# Patient Record
Sex: MALE | Race: WHITE | NOT HISPANIC OR LATINO | Employment: FULL TIME | URBAN - METROPOLITAN AREA
[De-identification: names, ages, dates, MRNs, and addresses within clinical notes are randomized per-mention and may not be internally consistent; named-entity substitution may affect disease eponyms.]

---

## 2019-05-06 ENCOUNTER — TELEPHONE (OUTPATIENT)
Dept: UROLOGY | Facility: CLINIC | Age: 34
End: 2019-05-06

## 2019-05-06 NOTE — TELEPHONE ENCOUNTER
Called patient and scheduled appointment for 5/13 after he's done taking antibiotics. Patient requested release of information form be faxed over to him since he doesn't live in the area and will be out of town soon. Form was faxed to him at [phone].

## 2019-05-06 NOTE — TELEPHONE ENCOUNTER
I have personally called and spoken to pt this morning regarding his LUTS.  Pt went to [facility] 2-3 weeks ago and was treated for possible UTI/Prostatitis symptoms.  Pt is currently on Cipro BID and will complete 21-day regimen of antibx's tomorrow.    Therefore please call him to reschedule with me later in week or next week.  We also need to obtain records from [facility].  Thanks.

## 2019-05-10 ENCOUNTER — TELEPHONE (OUTPATIENT)
Dept: UROLOGY | Facility: CLINIC | Age: 34
End: 2019-05-10

## 2019-05-10 NOTE — TELEPHONE ENCOUNTER
Called patient and informed him that we received records for his appointment.    ----- Message from Sherly Dougherty sent at 5/10/2019  8:35 AM CDT -----  Contact: patient  Type: Needs Medical Advice    Who Called:  patient  Best Call Back Number: [phone]  Additional Information: would like for the nurse to give him a call back in regards to any additional paperwork that may be needed prior to his appointment on Monday.

## 2019-05-10 NOTE — PROGRESS NOTES
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  He is  with one partner.    The pt initially went to [facility] in [place] on 4/17/19 with c/o dysuria x 4 days prior with urinary frequency and urgency.  He denies urethral discharge.  Pt was diagnosed with prostatitis and prescribed Cipro 500 mg BID x 21 days.    As of TODAY, pt c/o bladder discomfort, did dissipate a little since finishing Cipro antibx's, but restarted with symptoms after he returned from vacation.    Urine culture was performed during UC visit and showed No growth for infection.  Urinalysis showed the following during UC visit:  Dark Orange urine  1.005  6.5  Glucose--100  Bili-1.010  10 Protein; Urobili-4.0  +Nitrites    Questions asked the pt during ov today:  Urgency: Sometimes, urge incontinence? None  Feels like he completely empties his bladder.  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  Was drinking a lot of green tea.    Last PSA Screening: No results found for: PSA, PSADIAG    REVIEW OF SYSTEMS:  A comprehensive 10 system review was performed and is negative except as noted above in HPI    PMHx:  Past Medical History:   Diagnosis Date    HPV in male      Kidney stones: No  Cataracts? None    PSHx:  History reviewed. No pertinent surgical history.    Fam Hx:   malignancies: No    kidney stones: No     Soc Hx:  No " tobacco use  No alcohol  Occupation:Ultrasound Representative    Allergies:  Patient has no known allergies.    Medications: reviewed   Objective:     Vitals:    05/13/19 0909   BP: 137/89   Pulse: 72   Resp: 18   Temp: 98.3 °F (36.8 °C)     General:WDWN in NAD  Eyes: PERRLA, normal conjunctiva  Respiratory: no increased work on breathing, clear to auscultation  Cardiovascular: regular rate and rhythm. No obvious extremity edema.  GI: palpation of masses. No tenderness. No hepatosplenomegaly to palpation.  Musculoskeletal: normal range of motion of bilateral upper extremities. Normal muscle strength and tone.  Skin: no obvious rashes or lesions. No tightening of skin noted.  Neurologic: CN grossly normal. Normal sensation.   Psychiatric: awake, alert and oriented x 3. Mood and affect normal. Cooperative.    :  Inspection of anus and perineum normal  No scrotal rashes, cysts or lesions  Epididymis normal in size, no tenderness  Testes normal and size, equal size bilaterally, no masses  Urethral meatus normal without discharge  Penis is circumcised,    DEDRICK: 25g prostate gland without masses, tenderness. SV not palpable. Normal sphincter tone. No hemhorroids.  No bilateral inguinal hernias noted     PVR by bladder scan performed by me today: 33 mL    LABS REVIEW:  UA today:  Color:Clear, Yellow  Spec. Grav.  1.015  PH  7.0  Negative for leukocytes, nitrates, protein, glucose, ketones, urobili, bili, and blood.    Assessment:       1. Bladder pain    2. Dysuria          Plan:   Dysuria, Bladder discomfort:  (Prostatitis symptoms vs. IC)    We will prescribe pt another round of antibx's for pt symptoms:  Vibra-tabs 100 mg BID x 30 days.  Bladder irritant info given to pt during ov today, with all questions answered at this time.    F/u with me in one month to recheck symptoms.    [facility]: Inactive    Adela Sharma, TIMBO

## 2019-05-13 ENCOUNTER — OFFICE VISIT (OUTPATIENT)
Dept: UROLOGY | Facility: CLINIC | Age: 34
End: 2019-05-13
Payer: COMMERCIAL

## 2019-05-13 VITALS
DIASTOLIC BLOOD PRESSURE: 89 MMHG | HEART RATE: 72 BPM | SYSTOLIC BLOOD PRESSURE: 137 MMHG | HEIGHT: 73 IN | WEIGHT: 195.75 LBS | RESPIRATION RATE: 18 BRPM | TEMPERATURE: 98 F | BODY MASS INDEX: 25.94 KG/M2

## 2019-05-13 DIAGNOSIS — R39.89 BLADDER PAIN: Primary | ICD-10-CM

## 2019-05-13 DIAGNOSIS — R30.0 DYSURIA: ICD-10-CM

## 2019-05-13 LAB
BILIRUB SERPL-MCNC: NORMAL MG/DL
BLOOD URINE, POC: NORMAL
COLOR, POC UA: NORMAL
GLUCOSE UR QL STRIP: NORMAL
KETONES UR QL STRIP: NORMAL
LEUKOCYTE ESTERASE URINE, POC: NORMAL
NITRITE, POC UA: NORMAL
PH, POC UA: 7
POC RESIDUAL URINE VOLUME: 33 ML (ref 0–100)
PROTEIN, POC: NORMAL
SPECIFIC GRAVITY, POC UA: 1.01
UROBILINOGEN, POC UA: 0.2

## 2019-05-13 PROCEDURE — 99999 PR PBB SHADOW E&M-EST. PATIENT-LVL III: ICD-10-PCS | Mod: PBBFAC,,, | Performed by: NURSE PRACTITIONER

## 2019-05-13 PROCEDURE — 3008F BODY MASS INDEX DOCD: CPT | Mod: CPTII,S$GLB,, | Performed by: NURSE PRACTITIONER

## 2019-05-13 PROCEDURE — 81002 POCT URINE DIPSTICK WITHOUT MICROSCOPE: ICD-10-PCS | Mod: S$GLB,,, | Performed by: NURSE PRACTITIONER

## 2019-05-13 PROCEDURE — 99204 OFFICE O/P NEW MOD 45 MIN: CPT | Mod: 25,S$GLB,, | Performed by: NURSE PRACTITIONER

## 2019-05-13 PROCEDURE — 99204 PR OFFICE/OUTPT VISIT, NEW, LEVL IV, 45-59 MIN: ICD-10-PCS | Mod: 25,S$GLB,, | Performed by: NURSE PRACTITIONER

## 2019-05-13 PROCEDURE — 99999 PR PBB SHADOW E&M-EST. PATIENT-LVL III: CPT | Mod: PBBFAC,,, | Performed by: NURSE PRACTITIONER

## 2019-05-13 PROCEDURE — 51798 POCT BLADDER SCAN: ICD-10-PCS | Mod: S$GLB,,, | Performed by: NURSE PRACTITIONER

## 2019-05-13 PROCEDURE — 3008F PR BODY MASS INDEX (BMI) DOCUMENTED: ICD-10-PCS | Mod: CPTII,S$GLB,, | Performed by: NURSE PRACTITIONER

## 2019-05-13 PROCEDURE — 51798 US URINE CAPACITY MEASURE: CPT | Mod: S$GLB,,, | Performed by: NURSE PRACTITIONER

## 2019-05-13 PROCEDURE — 81002 URINALYSIS NONAUTO W/O SCOPE: CPT | Mod: S$GLB,,, | Performed by: NURSE PRACTITIONER

## 2019-05-13 RX ORDER — DOXYCYCLINE HYCLATE 100 MG
100 TABLET ORAL 2 TIMES DAILY
Qty: 60 TABLET | Refills: 0 | Status: SHIPPED | OUTPATIENT
Start: 2019-05-13 | End: 2019-06-13

## 2019-06-06 ENCOUNTER — TELEPHONE (OUTPATIENT)
Dept: UROLOGY | Facility: CLINIC | Age: 34
End: 2019-06-06

## 2019-06-06 RX ORDER — FLUCONAZOLE 150 MG/1
150 TABLET ORAL DAILY
Qty: 3 TABLET | Refills: 0 | Status: SHIPPED | OUTPATIENT
Start: 2019-06-06 | End: 2019-06-09

## 2019-06-06 NOTE — TELEPHONE ENCOUNTER
Returned call to patient to discuss symptoms. He states that he stopped taking gummy probiotics while changing his diet for IC and soon after, his anus started to get itchy. Patient isn't sure if it's an allergic reaction or a side effect of yeast growth. He's currently using his wife's monistat cream externally and it helps minimally, but he wants to know what else he can do to treat current symptoms.     ----- Message from Gerber Herring sent at 6/6/2019  8:19 AM CDT -----  Type: Needs Medical Advice    Who Called: self   Symptoms (please be specific):  NA   How long has patient had these symptoms:  RABIA   Pharmacy name and phone #:  RABIA Best Call Back Number: [phone]  Additional Information: Patient think the prescribed antibiotic maybe causing an allergic reaction.

## 2019-06-13 ENCOUNTER — OFFICE VISIT (OUTPATIENT)
Dept: UROLOGY | Facility: CLINIC | Age: 34
End: 2019-06-13
Payer: COMMERCIAL

## 2019-06-13 VITALS
WEIGHT: 193.13 LBS | BODY MASS INDEX: 25.6 KG/M2 | TEMPERATURE: 98 F | HEIGHT: 73 IN | RESPIRATION RATE: 18 BRPM | SYSTOLIC BLOOD PRESSURE: 134 MMHG | DIASTOLIC BLOOD PRESSURE: 91 MMHG | HEART RATE: 72 BPM

## 2019-06-13 DIAGNOSIS — N36.8 PAIN IN URETHRAL MEATUS: ICD-10-CM

## 2019-06-13 DIAGNOSIS — R39.15 URINARY URGENCY: ICD-10-CM

## 2019-06-13 DIAGNOSIS — R30.0 DYSURIA: ICD-10-CM

## 2019-06-13 DIAGNOSIS — R35.0 INCREASED URINARY FREQUENCY: ICD-10-CM

## 2019-06-13 DIAGNOSIS — R39.89 BLADDER PAIN: Primary | ICD-10-CM

## 2019-06-13 LAB
BILIRUB SERPL-MCNC: ABNORMAL MG/DL
BLOOD URINE, POC: ABNORMAL
COLOR, POC UA: YELLOW
GLUCOSE UR QL STRIP: ABNORMAL
KETONES UR QL STRIP: ABNORMAL
LEUKOCYTE ESTERASE URINE, POC: ABNORMAL
NITRITE, POC UA: ABNORMAL
PH, POC UA: 6.5
PROTEIN, POC: ABNORMAL
SPECIFIC GRAVITY, POC UA: 1.01
UROBILINOGEN, POC UA: 0.2

## 2019-06-13 PROCEDURE — 99999 PR PBB SHADOW E&M-EST. PATIENT-LVL IV: CPT | Mod: PBBFAC,,, | Performed by: NURSE PRACTITIONER

## 2019-06-13 PROCEDURE — 81002 URINALYSIS NONAUTO W/O SCOPE: CPT | Mod: S$GLB,,, | Performed by: NURSE PRACTITIONER

## 2019-06-13 PROCEDURE — 99214 PR OFFICE/OUTPT VISIT, EST, LEVL IV, 30-39 MIN: ICD-10-PCS | Mod: 25,S$GLB,, | Performed by: NURSE PRACTITIONER

## 2019-06-13 PROCEDURE — 3008F BODY MASS INDEX DOCD: CPT | Mod: CPTII,S$GLB,, | Performed by: NURSE PRACTITIONER

## 2019-06-13 PROCEDURE — 99999 PR PBB SHADOW E&M-EST. PATIENT-LVL IV: ICD-10-PCS | Mod: PBBFAC,,, | Performed by: NURSE PRACTITIONER

## 2019-06-13 PROCEDURE — 3008F PR BODY MASS INDEX (BMI) DOCUMENTED: ICD-10-PCS | Mod: CPTII,S$GLB,, | Performed by: NURSE PRACTITIONER

## 2019-06-13 PROCEDURE — 99214 OFFICE O/P EST MOD 30 MIN: CPT | Mod: 25,S$GLB,, | Performed by: NURSE PRACTITIONER

## 2019-06-13 PROCEDURE — 81002 POCT URINE DIPSTICK WITHOUT MICROSCOPE: ICD-10-PCS | Mod: S$GLB,,, | Performed by: NURSE PRACTITIONER

## 2019-06-13 RX ORDER — ALFUZOSIN HYDROCHLORIDE 10 MG/1
10 TABLET, EXTENDED RELEASE ORAL
Qty: 30 TABLET | Refills: 12 | Status: SHIPPED | OUTPATIENT
Start: 2019-06-13 | End: 2019-07-13

## 2019-06-13 RX ORDER — FLUCONAZOLE 150 MG/1
150 TABLET ORAL DAILY
Qty: 3 TABLET | Refills: 0 | Status: SHIPPED | OUTPATIENT
Start: 2019-06-13 | End: 2019-06-16

## 2019-06-13 NOTE — PATIENT INSTRUCTIONS
Alfuzosin extended-release tablets  What is this medicine?  ALFUZOSIN (al FYOO jonah sin) is used to treat benign prostatic hyperplasia (BPH) in men. This is a condition that causes you to have an enlarged prostate. This medicine is not for use in women.  How should I use this medicine?  Take this medicine by mouth with a glass of water. Follow the directions on the prescription label. Take this medicine after the same meal every day. This medicine should be taken just after eating food. Do not take on an empty stomach. Swallow whole. Do not cut, crush or chew this medicine. Take your doses at regular intervals. Do not take your medicine more often than directed. Do not stop taking except on the advice of your doctor or health care professional.  Talk to your pediatrician regarding the use of this medicine in children. Special care may be needed.  What side effects may I notice from receiving this medicine?  Side effects that you should report to your doctor or health care professional as soon as possible:  · allergic reactions like skin rash, itching or hives, swelling of the face, lips, or tongue  · breathing problems  · fast, irregular heartbeat  · feeling faint or lightheaded, falls  · prolonged or painful erection  · swelling of ankles or legs  · unusually weak or tired  · yellowing of eyes or skin  Side effects that usually do not require medical attention (report to your doctor or health care professional if they continue or are bothersome):  · constipation or diarrhea  · difficulty sleeping  · headache  · nausea or upset stomach  What may interact with this medicine?  Do not take this medicine with any of the following medications:  · certain medicines for fungal infections like fluconazole, itraconazole, ketoconazole, posaconazole, voriconazole  · cisapride  · dofetilide  · dronedarone  · droperidol  · levomethadyl  · other medicines for prostate  problems  · pimozide  · ritonavir  · thioridazine  · ziprasidone  This medicine may also interact with the following medications:  · cimetidine  · certain medicines for chest pain or blood pressure  · other medicines that prolong the QT interval (cause an abnormal heart rhythm)  · sildenafil  · tadalafil  · vardenafil  What if I miss a dose?  If you miss a dose, take it as soon as you can. If it is almost time for your next dose, take only that dose. Do not take double or extra doses.  Where should I keep my medicine?  Keep out of the reach of children.  Store at room temperature between 15 and 30 degrees C (59 and 86 degrees F). Protect from light and moisture. Throw away any unused medicine after the expiration date.  What should I tell my health care provider before I take this medicine?  They need to know if you have any of the following conditions:  · kidney or liver disease  · low blood pressure  · an unusual or allergic reaction to alfuzosin, other medicines, foods, dyes, or preservatives  What should I watch for while using this medicine?  Visit your doctor or health care professional for regular checks on your progress. Check your blood pressure regularly. Ask your doctor or health care professional what your blood pressure should be and when you should contact him or her.  Drowsiness and dizziness are more likely to occur after the first dose, after an increase in dose, or during hot weather or exercise. These effects can decrease once your body adjusts to this medicine. Do not drive, use machinery, or do anything that needs mental alertness until you know how this drug affects you. Do not stand or sit up quickly, especially if you are an older patient. This reduces the risk of dizzy or fainting spells. Alcohol can make you more drowsy and dizzy. Avoid alcoholic drinks.  Contact your doctor or health care professional right away if you have an erection that lasts longer than 4 hours or if it becomes  painful. This may be a sign of a serious problem and must be treated right away to prevent permanent damage.  NOTE:This sheet is a summary. It may not cover all possible information. If you have questions about this medicine, talk to your doctor, pharmacist, or health care provider. Copyright© 2017 [employer]

## 2019-06-13 NOTE — PROGRESS NOTES
"[facility] Note  Staff: TIMBO Rincon    PCP: None    Chief Complaint: Follow-up:  Hx of dysuria, bladder pain    Subjective:        HPI: Armand Maldonado is a 33 y.o. male presents today for routine recheck of his lower urinary tract symptoms.  After last ov with me, pt was given another round of antibiotics for his LUTS-Vibra-tabs 100 mg BID since receiving Cipro antibx's from recent UC visit for same symptoms prior.    On 6/6/19 pt called our telephone line and c/o the following:  "Pt stated that he stopped taking gummy probiotics while changing his diet for IC and soon after, his anus started to get itchy. Patient isn't sure if it's an allergic reaction or a side effect of yeast growth. He's currently using his wife's monistat cream externally and it helps minimally, but he wants to know what else he can do to treat current symptoms"  Therefore we responded back stating unsure whether anal itching could be SE of Vibra-tabs but went ahead and prescribed him Diflucan to see if this relieved his symptoms which pt states today it has improved his anal itching at this time.    Since taking the Vibra-tabs regimen (2nd antibx treatment regimen), pt's bladder pain has improved, but still remains with the following LUTS as of TODAY:  Urinary frequency and urgency and hesitancy  Underwear causing discomfort "tingling sensation at tip of penis"  Urine stream starts out "slow" intermittently not all the time.  It "sprays" then becomes full.  Urethral and dysuria pain when the bladder fills  Pain within the urethra even when he has an empty bladder.    Mild to mod constipation which he has started Benefiber for this issue.    PT'S  HX:  Pt was seen by me on 5/13/19 as a NP with c/o recent onset Dysuria and bladder pain which begain on 4/13/19 and here today for further evaluation of symptoms.  He is  with one partner.     The pt initially went to [facility] in [place] on 4/17/19 " "with c/o dysuria x 4 days prior with urinary frequency and urgency.  He denies urethral discharge.  Pt was diagnosed with prostatitis and prescribed Cipro 500 mg BID x 21 days.     As of last ov, pt was c/o bladder discomfort, did dissipate a little since finishing Cipro antibx's, but restarted with symptoms after he returned from vacation.     Urine culture was performed during UC visit and showed No growth for infection.  Urinalysis showed the following during UC visit:  Dark Orange urine  1.005  6.5  Glucose--100  Bili-1.010  10 Protein; Urobili-4.0  +Nitrites     Questions asked the pt during (05/2019) 1st office visit w/ Urology:  Urgency: Sometimes, urge incontinence? None  Feels like he completely empties his bladder.  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  Was drinking a lot of green tea.     Last PSA Screening: No results found for: PSA, PSADIAG     REVIEW OF SYSTEMS:  A comprehensive 10 system review was performed and is negative except as noted above in HPI     PMHx:  Past Medical History:   Diagnosis Date    HPV in male      Kidney stones: No    PSHx:  History reviewed. No pertinent surgical history.    Fam Hx:   malignancies: No  kidney stones: No     Allergies:  Patient has no known allergies.  Medications: reviewed   Objective:     Vitals:    06/13/19 1425   BP: (!) 134/91   Pulse: 72   Resp: 18   Temp: 97.8 °F (36.6 °C)     General:WDWN in NAD  Eyes: PERRLA, normal conjunctiva  Respiratory: no increased work on breathing, clear to auscultation  Cardiovascular: regular rate and rhythm. No obvious extremity edema.  GI: palpation of masses. No tenderness. No hepatosplenomegaly to " palpation.  Musculoskeletal: normal range of motion of bilateral upper extremities. Normal muscle strength and tone.  Skin: no obvious rashes or lesions. No tightening of skin noted.  Neurologic: CN grossly normal. Normal sensation.   Psychiatric: awake, alert and oriented x 3. Mood and affect normal. Cooperative.     Exam on 5/13/19:  Inspection of anus and perineum normal  No scrotal rashes, cysts or lesions  Epididymis normal in size, no tenderness  Testes normal and size, equal size bilaterally, no masses  Urethral meatus normal without discharge  Penis is circumcised,    DEDRICK: 25g prostate gland without masses, tenderness. SV not palpable. Normal sphincter tone. No hemhorroids.  No bilateral inguinal hernias noted   PVR by bladder scan performed by me on 5/13/19: 33 mL    LABS REVIEW:  UA today:  Color:Clear, Yellow  Spec. Grav.  1.010  PH  6.5  Trace of blood  Assessment:       1. Bladder pain    2. Dysuria    3. Pain in urethral meatus    4. Increased urinary frequency    5. Urinary urgency          Plan:   Dysuria, Bladder pain Follow-up:    Today, I have thoroughly discussed (over 30 min discussion with education) with pt several options for further treatment and evaluation of his ongoing symptoms >2 months including but not limited to the following:  Chronic prostatitis vs. Possible urethral stricture vs. IC vs. Urethritis vs. Kidney stone.    My recommendation would be the following:  -Pt to F/up with Urologist for possible UFS and Cysto if indicated by MD  -In meantime I will prescribe pt a trial of Uroxatral to see if this improves or worsens his symptoms at this time.  Benefits and risks of medication has been explained to pt today with all questions answered.    F/u with Urologist to discuss further options for evaluation of LUTS, unrelieved.    [facility]: None    Adela Sharma, BHAVIK-BRITNEY

## 2019-06-21 ENCOUNTER — TELEPHONE (OUTPATIENT)
Dept: UROLOGY | Facility: CLINIC | Age: 34
End: 2019-06-21

## 2019-06-21 NOTE — TELEPHONE ENCOUNTER
Spoke w pt declined earlier appt due to he has scheduled with another urologist in [place]. Stated that it was close to his home . Will cancel future appt as well.

## 2020-09-28 NOTE — TELEPHONE ENCOUNTER
Addended by: CANDICE CANTU on: 9/28/2020 09:47 AM     Modules accepted: Shelbi, Lucy     Patient informed of medication called to treat symptoms.              Adela Sharma, HECTORP  Nicky Estes MA   Caller: Unspecified (Today,  8:31 AM)             Call the pt and advise him that I will prescribe him 3 day regimen of Diflucan.   I have never heard of Doxy causing anal itching, but may be side affect?  We will try the Diflucan internally and then he can use monistat cream topically at same time.

## 2021-11-09 ENCOUNTER — OFFICE VISIT (OUTPATIENT)
Dept: OTOLARYNGOLOGY | Facility: CLINIC | Age: 36
End: 2021-11-09
Payer: COMMERCIAL

## 2021-11-09 VITALS — HEIGHT: 73 IN | WEIGHT: 213.63 LBS | BODY MASS INDEX: 28.31 KG/M2

## 2021-11-09 DIAGNOSIS — H61.22 LEFT EAR IMPACTED CERUMEN: Primary | ICD-10-CM

## 2021-11-09 DIAGNOSIS — H92.02 OTALGIA OF LEFT EAR: ICD-10-CM

## 2021-11-09 DIAGNOSIS — H91.90 HEARING LOSS, UNSPECIFIED HEARING LOSS TYPE, UNSPECIFIED LATERALITY: ICD-10-CM

## 2021-11-09 PROCEDURE — 69210 PR REMOVAL IMPACTED CERUMEN REQUIRING INSTRUMENTATION, UNILATERAL: ICD-10-PCS | Mod: S$GLB,,, | Performed by: OTOLARYNGOLOGY

## 2021-11-09 PROCEDURE — 99999 PR PBB SHADOW E&M-NEW PATIENT-LVL III: ICD-10-PCS | Mod: PBBFAC,,, | Performed by: OTOLARYNGOLOGY

## 2021-11-09 PROCEDURE — 1160F RVW MEDS BY RX/DR IN RCRD: CPT | Mod: CPTII,S$GLB,, | Performed by: OTOLARYNGOLOGY

## 2021-11-09 PROCEDURE — 99999 PR PBB SHADOW E&M-NEW PATIENT-LVL III: CPT | Mod: PBBFAC,,, | Performed by: OTOLARYNGOLOGY

## 2021-11-09 PROCEDURE — 69210 REMOVE IMPACTED EAR WAX UNI: CPT | Mod: S$GLB,,, | Performed by: OTOLARYNGOLOGY

## 2021-11-09 PROCEDURE — 1159F MED LIST DOCD IN RCRD: CPT | Mod: CPTII,S$GLB,, | Performed by: OTOLARYNGOLOGY

## 2021-11-09 PROCEDURE — 99203 OFFICE O/P NEW LOW 30 MIN: CPT | Mod: 25,S$GLB,, | Performed by: OTOLARYNGOLOGY

## 2021-11-09 PROCEDURE — 99203 PR OFFICE/OUTPT VISIT, NEW, LEVL III, 30-44 MIN: ICD-10-PCS | Mod: 25,S$GLB,, | Performed by: OTOLARYNGOLOGY

## 2021-11-09 PROCEDURE — 1159F PR MEDICATION LIST DOCUMENTED IN MEDICAL RECORD: ICD-10-PCS | Mod: CPTII,S$GLB,, | Performed by: OTOLARYNGOLOGY

## 2021-11-09 PROCEDURE — 1160F PR REVIEW ALL MEDS BY PRESCRIBER/CLIN PHARMACIST DOCUMENTED: ICD-10-PCS | Mod: CPTII,S$GLB,, | Performed by: OTOLARYNGOLOGY

## 2021-11-09 PROCEDURE — 3008F BODY MASS INDEX DOCD: CPT | Mod: CPTII,S$GLB,, | Performed by: OTOLARYNGOLOGY

## 2021-11-09 PROCEDURE — 3008F PR BODY MASS INDEX (BMI) DOCUMENTED: ICD-10-PCS | Mod: CPTII,S$GLB,, | Performed by: OTOLARYNGOLOGY

## 2021-11-09 RX ORDER — MIRABEGRON 50 MG/1
50 TABLET, FILM COATED, EXTENDED RELEASE ORAL DAILY
COMMUNITY
Start: 2019-09-01

## 2021-11-09 RX ORDER — ALFUZOSIN HYDROCHLORIDE 10 MG/1
10 TABLET, EXTENDED RELEASE ORAL DAILY
COMMUNITY
Start: 2019-06-01

## 2021-11-10 ENCOUNTER — IMMUNIZATION (OUTPATIENT)
Dept: FAMILY MEDICINE | Facility: CLINIC | Age: 36
End: 2021-11-10
Payer: COMMERCIAL

## 2021-11-10 DIAGNOSIS — Z23 NEED FOR VACCINATION: Primary | ICD-10-CM

## 2021-11-10 PROCEDURE — 91300 COVID-19, MRNA, LNP-S, PF, 30 MCG/0.3 ML DOSE VACCINE: CPT | Mod: PBBFAC | Performed by: INTERNAL MEDICINE

## 2021-11-10 PROCEDURE — 0002A COVID-19, MRNA, LNP-S, PF, 30 MCG/0.3 ML DOSE VACCINE: CPT | Mod: PBBFAC | Performed by: INTERNAL MEDICINE

## 2022-09-22 ENCOUNTER — OFFICE VISIT (OUTPATIENT)
Dept: DERMATOLOGY | Facility: CLINIC | Age: 37
End: 2022-09-22
Payer: COMMERCIAL

## 2022-09-22 DIAGNOSIS — L24.9 IRRITANT DERMATITIS: Primary | ICD-10-CM

## 2022-09-22 PROCEDURE — 1160F RVW MEDS BY RX/DR IN RCRD: CPT | Mod: CPTII,S$GLB,, | Performed by: STUDENT IN AN ORGANIZED HEALTH CARE EDUCATION/TRAINING PROGRAM

## 2022-09-22 PROCEDURE — 99203 OFFICE O/P NEW LOW 30 MIN: CPT | Mod: S$GLB,,, | Performed by: STUDENT IN AN ORGANIZED HEALTH CARE EDUCATION/TRAINING PROGRAM

## 2022-09-22 PROCEDURE — 99203 PR OFFICE/OUTPT VISIT, NEW, LEVL III, 30-44 MIN: ICD-10-PCS | Mod: S$GLB,,, | Performed by: STUDENT IN AN ORGANIZED HEALTH CARE EDUCATION/TRAINING PROGRAM

## 2022-09-22 PROCEDURE — 1160F PR REVIEW ALL MEDS BY PRESCRIBER/CLIN PHARMACIST DOCUMENTED: ICD-10-PCS | Mod: CPTII,S$GLB,, | Performed by: STUDENT IN AN ORGANIZED HEALTH CARE EDUCATION/TRAINING PROGRAM

## 2022-09-22 PROCEDURE — 99999 PR PBB SHADOW E&M-EST. PATIENT-LVL III: ICD-10-PCS | Mod: PBBFAC,,, | Performed by: STUDENT IN AN ORGANIZED HEALTH CARE EDUCATION/TRAINING PROGRAM

## 2022-09-22 PROCEDURE — 99999 PR PBB SHADOW E&M-EST. PATIENT-LVL III: CPT | Mod: PBBFAC,,, | Performed by: STUDENT IN AN ORGANIZED HEALTH CARE EDUCATION/TRAINING PROGRAM

## 2022-09-22 PROCEDURE — 1159F PR MEDICATION LIST DOCUMENTED IN MEDICAL RECORD: ICD-10-PCS | Mod: CPTII,S$GLB,, | Performed by: STUDENT IN AN ORGANIZED HEALTH CARE EDUCATION/TRAINING PROGRAM

## 2022-09-22 PROCEDURE — 1159F MED LIST DOCD IN RCRD: CPT | Mod: CPTII,S$GLB,, | Performed by: STUDENT IN AN ORGANIZED HEALTH CARE EDUCATION/TRAINING PROGRAM

## 2022-09-22 RX ORDER — BETAMETHASONE VALERATE 1.2 MG/G
OINTMENT TOPICAL 2 TIMES DAILY
Qty: 45 G | Refills: 1 | Status: SHIPPED | OUTPATIENT
Start: 2022-09-22

## 2022-09-22 RX ORDER — CLOTRIMAZOLE AND BETAMETHASONE DIPROPIONATE 10; .64 MG/G; MG/G
CREAM TOPICAL
COMMUNITY
Start: 2022-09-21 | End: 2023-09-21

## 2022-09-22 NOTE — PROGRESS NOTES
Patient Information  Name: Armand Maldonado  : 1985  MRN: [id number]     Referring Physician:  Dr. Mullen   Primary Care Physician:   Primary Doctor No   Date of Visit: 2022      Subjective:       Armand Maldonado is a 37 y.o. male who presents for   Chief Complaint   Patient presents with    Rash     On arm. Sx itches, x Friday. Tx triamcinolone cream and benadryl cream      HPI  Patient with new complaint of lesion(s)  Location: left arm  Duration: 1 week  Symptoms: itching  Relieving factors/Previous treatments: Triamcinolone/benadryl cream    Denies any changes to detergent, cologne, body washes.     Patient was last seen:Visit date not found     Prior notes by myself reviewed.   Clinical documentation obtained by nursing staff reviewed.    Review of Systems   Skin:  Positive for itching and rash.      Objective:    Physical Exam   Constitutional: He appears well-developed and well-nourished. No distress.   Neurological: He is alert and oriented to person, place, and time. He is not disoriented.   Psychiatric: He has a normal mood and affect.   Skin:   Areas Examined (abnormalities noted in diagram):   LUE Inspection Performed            Diagram Legend     Erythematous scaling macule/papule c/w actinic keratosis       Vascular papule c/w angioma      Pigmented verrucoid papule/plaque c/w seborrheic keratosis      Yellow umbilicated papule c/w sebaceous hyperplasia      Irregularly shaped tan macule c/w lentigo     1-2 mm smooth white papules consistent with Milia      Movable subcutaneous cyst with punctum c/w epidermal inclusion cyst      Subcutaneous movable cyst c/w pilar cyst      Firm pink to brown papule c/w dermatofibroma      Pedunculated fleshy papule(s) c/w skin tag(s)      Evenly pigmented macule c/w junctional nevus     Mildly variegated pigmented, slightly irregular-bordered macule c/w mildly atypical nevus      Flesh colored to evenly pigmented papule c/w intradermal nevus        Pink pearly papule/plaque c/w basal cell carcinoma      Erythematous hyperkeratotic cursted plaque c/w SCC      Surgical scar with no sign of skin cancer recurrence      Open and closed comedones      Inflammatory papules and pustules      Verrucoid papule consistent consistent with wart     Erythematous eczematous patches and plaques     Dystrophic onycholytic nail with subungual debris c/w onychomycosis     Umbilicated papule    Erythematous-base heme-crusted tan verrucoid plaque consistent with inflamed seborrheic keratosis     Erythematous Silvery Scaling Plaque c/w Psoriasis     See annotation      No images are attached to the encounter or orders placed in the encounter.    [] Data reviewed  [] Independent review of test  [] Management discussed with another provider    Assessment / Plan:        Irritant dermatitis suspect from lysol as he was cleaning his watch with lysol with component of id reaction  -     betamethasone valerate 0.1% (VALISONE) 0.1 % Oint; Apply topically 2 (two) times daily. Use up to 2 weeks at a time  Dispense: 45 g; Refill: 1  Counseling on topical steroids:  Patient counseled that the prolonged use of topical steroids can result in the increased appearance superficial blood vessels (telangiectasias) lightening (hypopigmentation), and   thinning of the skin ( atrophy).  Patient understands to avoid using high potency steroids in skin folds, the groin or the face.  The patient verbalized understanding of proper use and possible adverse effects of topical steroids.  All patient's questions and concerns were addressed.           LOS NUMBER AND COMPLEXITY OF PROBLEMS    COMPLEXITY OF DATA RISK TOTAL TIME (m)   01876  84648 [] 1 self-limited or minor problem [x] Minimal to none [] No treatment recommended or patient to monitor 15-29  10-19   46166  69742 Low  [] 2 or > self limited or minor problems  [] 1 stable chronic illness  [x] 1 acute, uncomplicated illness or injury Limited (2)  []  Prior external notes from each unique source  [] Review result of each unique test  [] Order each unique test []  Low  OTC medications, minor skin biopsy 30-44  20-29   32003  01545 Moderate  []  1 or > chronic illness with progression, exacerbation or SE of treatment  []  2 or more stable chronic illnesses  []  1 acute illness with systemic symptoms  []  1 acute complicated injury  []  1 undiagnosed new problem with uncertain prognosis Moderate (1/3 below)  []  3 or more data items        *Now includes assessment requiring independent historian  []  Independent interpretation of a test  []  Discuss management/test with another provider Moderate  [x]  Prescription drug mgmt  []  Minor surgery with risk discussed  []  Mgmt limited by social determinates 45-59  30-39   52698  07006 High  []  1 or more chronic illness with severe exacerbation, progression or SE of treatment  []  1 acute or chronic illness/injury that poses a threat to life or bodily function Extensive (2/3 below)  []  3 or more data items        *Now includes assessment requiring independent historian.  []  Independent interpretation of a test  []  Discuss management/test with another provider High  []  Major surgery with risk discussed  []  Drug therapy requiring intensive monitoring for toxicity  []  Hospitalization  []  Decision for DNR 60-74  40-54      No follow-ups on file.    Sahra Lindo MD, FAAD  [facility]

## 2025-07-28 ENCOUNTER — OFFICE VISIT (OUTPATIENT)
Dept: FAMILY MEDICINE | Facility: CLINIC | Age: 40
End: 2025-07-28
Payer: COMMERCIAL

## 2025-07-28 VITALS
DIASTOLIC BLOOD PRESSURE: 86 MMHG | WEIGHT: 196.63 LBS | SYSTOLIC BLOOD PRESSURE: 134 MMHG | OXYGEN SATURATION: 98 % | BODY MASS INDEX: 26.06 KG/M2 | HEIGHT: 73 IN | TEMPERATURE: 98 F | HEART RATE: 68 BPM

## 2025-07-28 DIAGNOSIS — M25.50 ARTHRALGIA, UNSPECIFIED JOINT: ICD-10-CM

## 2025-07-28 DIAGNOSIS — Z00.00 ENCOUNTER FOR SCREENING AND PREVENTATIVE CARE: Primary | ICD-10-CM

## 2025-07-28 PROCEDURE — 99999 PR PBB SHADOW E&M-EST. PATIENT-LVL III: CPT | Mod: PBBFAC,,, | Performed by: EMERGENCY MEDICINE

## 2025-07-28 PROCEDURE — 3079F DIAST BP 80-89 MM HG: CPT | Mod: CPTII,S$GLB,, | Performed by: EMERGENCY MEDICINE

## 2025-07-28 PROCEDURE — 3075F SYST BP GE 130 - 139MM HG: CPT | Mod: CPTII,S$GLB,, | Performed by: EMERGENCY MEDICINE

## 2025-07-28 PROCEDURE — 1159F MED LIST DOCD IN RCRD: CPT | Mod: CPTII,S$GLB,, | Performed by: EMERGENCY MEDICINE

## 2025-07-28 PROCEDURE — 90471 IMMUNIZATION ADMIN: CPT | Mod: S$GLB,,, | Performed by: EMERGENCY MEDICINE

## 2025-07-28 PROCEDURE — 90715 TDAP VACCINE 7 YRS/> IM: CPT | Mod: S$GLB,,, | Performed by: EMERGENCY MEDICINE

## 2025-07-28 PROCEDURE — 3008F BODY MASS INDEX DOCD: CPT | Mod: CPTII,S$GLB,, | Performed by: EMERGENCY MEDICINE

## 2025-07-28 PROCEDURE — 99204 OFFICE O/P NEW MOD 45 MIN: CPT | Mod: 25,S$GLB,, | Performed by: EMERGENCY MEDICINE

## 2025-07-28 RX ORDER — CELECOXIB 200 MG/1
200 CAPSULE ORAL DAILY
COMMUNITY
Start: 2025-06-06

## 2025-07-28 NOTE — PROGRESS NOTES
??NEW PATIENT??  Name: Armand Maldonado  MRN: [id number]  : 1985    Subjective     Subjective   HPI  Armand Maldonado is here today to establish care.    Objective     Review of Systems   Constitutional: Negative.    HENT: Negative.     Eyes: Negative.    Respiratory: Negative.     Cardiovascular: Negative.    Gastrointestinal: Negative.    Endocrine: Negative.    Genitourinary: Negative.    Musculoskeletal: Negative.    Skin: Negative.    Allergic/Immunologic: Negative.    Neurological: Negative.    Hematological: Negative.    Psychiatric/Behavioral: Negative.     All other systems reviewed and are negative.       Problem List[1]    Medications Ordered Prior to Encounter[2]    Past Medical History:   Diagnosis Date    HPV in male        No past surgical history on file.     Family History   Problem Relation Name Age of Onset    No Known Problems Father      No Known Problems Mother      No Known Problems Maternal Aunt      No Known Problems Maternal Uncle      No Known Problems Paternal Aunt      No Known Problems Paternal Uncle      No Known Problems Paternal Grandmother      No Known Problems Paternal Grandfather      No Known Problems Maternal Grandmother      No Known Problems Maternal Grandfather         Social History[3]    Review of patient's allergies indicates:  No Known Allergies     Health Maintenance Due   Topic Date Due    Hepatitis C Screening  Never done    Lipid Panel  Never done    HIV Screening  Never done    TETANUS VACCINE  Never done    Hemoglobin A1c (Diabetic Prevention Screening)  Never done    COVID-19 Vaccine (3 - 2024-25 season) 2024       Objective   Vitals:    25 0849   BP: 134/86   Pulse: 68   Temp: 98.1 °F (36.7 °C)        Physical Exam  Vitals and nursing note reviewed.   Constitutional:       General: He is not in acute distress.     Appearance: Normal appearance. He is well-developed.   HENT:      Head: Normocephalic and atraumatic.      Right Ear: External ear  normal.      Left Ear: External ear normal.      Mouth/Throat:      Mouth: Mucous membranes are moist.   Eyes:      General: Lids are normal. Gaze aligned appropriately.      Extraocular Movements: Extraocular movements intact.      Conjunctiva/sclera: Conjunctivae normal.      Pupils: Pupils are equal, round, and reactive to light.   Cardiovascular:      Rate and Rhythm: Normal rate and regular rhythm.      Heart sounds: No murmur heard.     No friction rub. No gallop.   Pulmonary:      Effort: Pulmonary effort is normal. No respiratory distress.      Breath sounds: Normal breath sounds and air entry. No wheezing, rhonchi or rales.   Abdominal:      General: Abdomen is flat. There is no distension.   Musculoskeletal:         General: No swelling or deformity.      Cervical back: Full passive range of motion without pain, normal range of motion and neck supple.      Right lower leg: No edema.      Left lower leg: No edema.   Skin:     General: Skin is warm and dry.      Coloration: Skin is not jaundiced.   Neurological:      General: No focal deficit present.      Mental Status: He is alert and oriented to person, place, and time. Mental status is at baseline.      GCS: GCS eye subscore is 4. GCS verbal subscore is 5. GCS motor subscore is 6.   Psychiatric:         Attention and Perception: Attention and perception normal.         Mood and Affect: Mood normal.         Speech: Speech normal.         Behavior: Behavior normal. Behavior is cooperative.         Thought Content: Thought content normal.         Cognition and Memory: Cognition normal.         Judgment: Judgment normal.          Screening Tools     ?? Alcohol Use Screening ??    Patient denies Alcohol use []    CAGE Questionnaire:  - Have you ever felt you should Cut down on your drinking?  [] Yes   [x] No  - Have people Annoyed you by criticizing your drinking?     [] Yes   [x] No  - Have you ever felt Guilty about your drinking?            [] Yes   [x]  No  - Have you ever had a drink first thing in the morning       to steady your nerves or get rid of a hangover (Eye-opener)? [] Yes   [x] No    [] CAGE score >=2: Positive screen for potential alcohol use disorder  [x] CAGE score <=2: Negative screen for potential alcohol use disorder    Medical Decision Making     Assessment & Plan  Encounter for screening and preventative care  Patient needs some health maintenance items updating, we discussed them and the patient was agreeable with all orders placed.  We will obtain them and then address any issues.  This was discussed with the patient and they are aware that we will address results via the [facility] and as needed.    Advised that a healthy lifestyle is built on a foundation of balanced nutrition, regular physical activity, sufficient sleep, stress management, and positive social connections. We discussed prioritizing whole, nutrient-dense foods, staying hydrated, engaging in movement daily, and maintaining mental well-being contribute to overall health. Patient advised to avoid harmful habits like smoking, excessive alcohol consumption, and chronic stress.      Orders:    CBC Auto Differential; Future    Comprehensive Metabolic Panel; Future    HIV 1/2 Ag/Ab (4th Gen); Future    Hepatitis C Antibody; Future    Hemoglobin A1C; Future    Lipid Panel; Future    DIPH,PERTUSS(ACEL),TET VAC(PF)(ADULT)(ADACEL)(TDaP)    Arthralgia, unspecified joint  Patient relates that he has had ongoing joint pain, primary in his hands starting in his knuckles.  It was present upon waking.  Atraumatic.  No fever / chills.  Seen at [facility] was found to have inflammation, X rays were normal and underwent physical therapy.  He relates that some of this helps but not completely.    We will obtain uric acid and inflammatory markers today (he has a family history of Gout)    Orders:    Uric Acid; Future    C-Reactive Protein; Future    Sedimentation rate; Future         Additional  Discussion     ?? Vaccine discussion ??    Patient declined the following vaccines today after discussion of risk / benefits:     [] Tetanus  [] Pneumococcal  [x] COVID 19  [] Respiratory Syncytial Virus  [] Shingles / Zoster    Patient will go to the Pharmacy for the following vaccines, we discussed risks / benefits:    [] Tetanus  [] Pneumococcal  [] COVID 19  [] Respiratory Syncytial Virus  [] Shingles / Zoster    Continuity     Follow up  6 months    Patient is encouraged to contact me at anytime via [facility] or my office for any needs.    Lenin Steele MD  Primary Care ??  07/28/2025     DISCLAIMER: This note was prepared with [employer] Direct voice recognition transcription software. Garbled syntax, mangled pronouns, and other bizarre constructions may be attributed to that software system.  I attest to having reviewed and edited the generated note for accuracy, though some syntax or spelling errors may persist. Please contact the author of this note for any clarification.         [1]   Patient Active Problem List  Diagnosis    Arthralgia   [2]   Current Outpatient Medications on File Prior to Visit   Medication Sig Dispense Refill    celecoxib (CELEBREX) 200 MG capsule Take 200 mg by mouth once daily.      [DISCONTINUED] alfuzosin (UROXATRAL) 10 mg Tb24 Take 10 mg by mouth once daily.      [DISCONTINUED] betamethasone valerate 0.1% (VALISONE) 0.1 % Oint Apply topically 2 (two) times daily. Use up to 2 weeks at a time 45 g 1    [DISCONTINUED] mirabegron (MYRBETRIQ) 50 mg Tb24 Take 50 mg by mouth once daily.      [DISCONTINUED] naproxen sodium (ANAPROX) 550 MG tablet Take 1 tablet (550 mg total) by mouth 2 (two) times daily with meals. 20 tablet 0     No current facility-administered medications on file prior to visit.   [3]   Social History  Socioeconomic History    Marital status:    Tobacco Use    Smoking status: Never    Smokeless tobacco: Never    Tobacco comments:     occasional smoker    Substance and Sexual Activity    Alcohol use: Not Currently    Drug use: Never    Sexual activity: Not Currently   Social History Narrative    ** Merged History Encounter **          Social Drivers of Health     Financial Resource Strain: Low Risk  (7/27/2025)    Overall Financial Resource Strain (CARDIA)     Difficulty of Paying Living Expenses: Not hard at all   Food Insecurity: No Food Insecurity (7/27/2025)    Hunger Vital Sign     Worried About Running Out of Food in the Last Year: Never true     Ran Out of Food in the Last Year: Never true   Transportation Needs: No Transportation Needs (7/27/2025)    PRAPARE - Transportation     Lack of Transportation (Medical): No     Lack of Transportation (Non-Medical): No   Physical Activity: Sufficiently Active (7/27/2025)    Exercise Vital Sign     Days of Exercise per Week: 4 days     Minutes of Exercise per Session: 40 min   Stress: No Stress Concern Present (7/27/2025)    [employer] [place] [employer] - Occupational Stress Questionnaire     Feeling of Stress : Only a little   Housing Stability: Low Risk  (7/27/2025)    Housing Stability Vital Sign     Unable to Pay for Housing in the Last Year: No     Number of Times Moved in the Last Year: 0     Homeless in the Last Year: No

## 2025-07-28 NOTE — ASSESSMENT & PLAN NOTE
Patient relates that he has had ongoing joint pain, primary in his hands starting in his knuckles.  It was present upon waking.  Atraumatic.  No fever / chills.  Seen at [facility] was found to have inflammation, X rays were normal and underwent physical therapy.  He relates that some of this helps but not completely.    We will obtain uric acid and inflammatory markers today (he has a family history of Gout)    Orders:    Uric Acid; Future    C-Reactive Protein; Future    Sedimentation rate; Future

## 2025-07-28 NOTE — PATIENT INSTRUCTIONS
Thank you for visiting us today. We're dedicated to supporting you in achieving your healthiest self.    It's important to keep all scheduled appointments and follow up as discussed to ensure the best outcomes.    Whenever possible, please contact our office for urgent needs rather than going directly to Urgent Care or the Emergency Room.     Of course, if you're experiencing a true emergency, do not hesitate to go to the Emergency Room or call 911!    However, let's work together to use our healthcare resources wisely and reserve emergency services for situations that truly require them.    If you have any questions or anything to add you can reach me via [facility] at any time.       Lenin Steele M.D.  [facility]  [facility] / [place]    If you are happy with your care please consider adding a review at;    Dr. Lenin Steele MD - Family Medicine Physician in [facility]

## 2025-07-29 ENCOUNTER — LAB VISIT (OUTPATIENT)
Dept: LAB | Facility: HOSPITAL | Age: 40
End: 2025-07-29
Attending: EMERGENCY MEDICINE
Payer: COMMERCIAL

## 2025-07-29 DIAGNOSIS — M25.50 ARTHRALGIA, UNSPECIFIED JOINT: ICD-10-CM

## 2025-07-29 DIAGNOSIS — Z00.00 ENCOUNTER FOR SCREENING AND PREVENTATIVE CARE: ICD-10-CM

## 2025-07-29 LAB
ABSOLUTE EOSINOPHIL (OHS): 0.09 K/UL
ABSOLUTE MONOCYTE (OHS): 0.4 K/UL (ref 0.3–1)
ABSOLUTE NEUTROPHIL COUNT (OHS): 4.03 K/UL (ref 1.8–7.7)
ALBUMIN SERPL BCP-MCNC: 4.8 G/DL (ref 3.5–5.2)
ALP SERPL-CCNC: 30 UNIT/L (ref 40–150)
ALT SERPL W/O P-5'-P-CCNC: 19 UNIT/L (ref 10–44)
ANION GAP (OHS): 10 MMOL/L (ref 8–16)
AST SERPL-CCNC: 17 UNIT/L (ref 11–45)
BASOPHILS # BLD AUTO: 0.03 K/UL
BASOPHILS NFR BLD AUTO: 0.5 %
BILIRUB SERPL-MCNC: 0.8 MG/DL (ref 0.1–1)
BUN SERPL-MCNC: 16 MG/DL (ref 6–20)
CALCIUM SERPL-MCNC: 9.3 MG/DL (ref 8.7–10.5)
CHLORIDE SERPL-SCNC: 103 MMOL/L (ref 95–110)
CHOLEST SERPL-MCNC: 224 MG/DL (ref 120–199)
CHOLEST/HDLC SERPL: 5.3 {RATIO} (ref 2–5)
CO2 SERPL-SCNC: 26 MMOL/L (ref 23–29)
CREAT SERPL-MCNC: 1.1 MG/DL (ref 0.5–1.4)
CRP SERPL-MCNC: 0.9 MG/L
EAG (OHS): 88 MG/DL (ref 68–131)
ERYTHROCYTE [DISTWIDTH] IN BLOOD BY AUTOMATED COUNT: 10.8 % (ref 11.5–14.5)
ERYTHROCYTE [SEDIMENTATION RATE] IN BLOOD BY PHOTOMETRIC METHOD: 2 MM/HR
GFR SERPLBLD CREATININE-BSD FMLA CKD-EPI: >60 ML/MIN/1.73/M2
GLUCOSE SERPL-MCNC: 89 MG/DL (ref 70–110)
HBA1C MFR BLD: 4.7 % (ref 4–5.6)
HCT VFR BLD AUTO: 46.9 % (ref 40–54)
HCV AB SERPL QL IA: NORMAL
HDLC SERPL-MCNC: 42 MG/DL (ref 40–75)
HDLC SERPL: 18.8 % (ref 20–50)
HGB BLD-MCNC: 16 GM/DL (ref 14–18)
HIV 1+2 AB+HIV1 P24 AG SERPL QL IA: NORMAL
IMM GRANULOCYTES # BLD AUTO: 0.01 K/UL (ref 0–0.04)
IMM GRANULOCYTES NFR BLD AUTO: 0.2 % (ref 0–0.5)
LDLC SERPL CALC-MCNC: 164 MG/DL (ref 63–159)
LYMPHOCYTES # BLD AUTO: 1.2 K/UL (ref 1–4.8)
MCH RBC QN AUTO: 31.3 PG (ref 27–31)
MCHC RBC AUTO-ENTMCNC: 34.1 G/DL (ref 32–36)
MCV RBC AUTO: 92 FL (ref 82–98)
NONHDLC SERPL-MCNC: 182 MG/DL
NUCLEATED RBC (/100WBC) (OHS): 0 /100 WBC
PLATELET # BLD AUTO: 172 K/UL (ref 150–450)
PMV BLD AUTO: 12.2 FL (ref 9.2–12.9)
POTASSIUM SERPL-SCNC: 4.2 MMOL/L (ref 3.5–5.1)
PROT SERPL-MCNC: 7.8 GM/DL (ref 6–8.4)
RBC # BLD AUTO: 5.12 M/UL (ref 4.6–6.2)
RELATIVE EOSINOPHIL (OHS): 1.6 %
RELATIVE LYMPHOCYTE (OHS): 20.8 % (ref 18–48)
RELATIVE MONOCYTE (OHS): 6.9 % (ref 4–15)
RELATIVE NEUTROPHIL (OHS): 70 % (ref 38–73)
SODIUM SERPL-SCNC: 139 MMOL/L (ref 136–145)
TRIGL SERPL-MCNC: 90 MG/DL (ref 30–150)
URATE SERPL-MCNC: 7.5 MG/DL (ref 3.4–7)
WBC # BLD AUTO: 5.76 K/UL (ref 3.9–12.7)

## 2025-07-29 PROCEDURE — 85025 COMPLETE CBC W/AUTO DIFF WBC: CPT

## 2025-07-29 PROCEDURE — 36415 COLL VENOUS BLD VENIPUNCTURE: CPT | Mod: PO

## 2025-07-29 PROCEDURE — 87389 HIV-1 AG W/HIV-1&-2 AB AG IA: CPT

## 2025-07-29 PROCEDURE — 84450 TRANSFERASE (AST) (SGOT): CPT | Mod: PO

## 2025-07-29 PROCEDURE — 80061 LIPID PANEL: CPT

## 2025-07-29 PROCEDURE — 83036 HEMOGLOBIN GLYCOSYLATED A1C: CPT

## 2025-07-29 PROCEDURE — 85652 RBC SED RATE AUTOMATED: CPT

## 2025-07-29 PROCEDURE — 86803 HEPATITIS C AB TEST: CPT

## 2025-07-29 PROCEDURE — 84550 ASSAY OF BLOOD/URIC ACID: CPT

## 2025-07-29 PROCEDURE — 86140 C-REACTIVE PROTEIN: CPT

## 2025-07-30 ENCOUNTER — RESULTS FOLLOW-UP (OUTPATIENT)
Dept: FAMILY MEDICINE | Facility: CLINIC | Age: 40
End: 2025-07-30
Payer: COMMERCIAL

## 2025-07-30 DIAGNOSIS — E78.2 MIXED HYPERLIPIDEMIA: ICD-10-CM

## 2025-07-30 DIAGNOSIS — M10.9 ACUTE GOUT OF MULTIPLE SITES, UNSPECIFIED CAUSE: Primary | ICD-10-CM

## 2025-07-30 RX ORDER — ALLOPURINOL 100 MG/1
100 TABLET ORAL DAILY
Qty: 90 TABLET | Refills: 1 | Status: SHIPPED | OUTPATIENT
Start: 2025-07-30 | End: 2026-01-26

## 2025-07-30 RX ORDER — ATORVASTATIN CALCIUM 20 MG/1
20 TABLET, FILM COATED ORAL DAILY
Qty: 90 TABLET | Refills: 3 | Status: SHIPPED | OUTPATIENT
Start: 2025-07-30 | End: 2026-07-30